# Patient Record
Sex: FEMALE | Race: WHITE
[De-identification: names, ages, dates, MRNs, and addresses within clinical notes are randomized per-mention and may not be internally consistent; named-entity substitution may affect disease eponyms.]

---

## 2017-06-16 ENCOUNTER — HOSPITAL ENCOUNTER (OUTPATIENT)
Dept: HOSPITAL 47 - RADMRIMAIN | Age: 57
Discharge: HOME | End: 2017-06-16
Payer: COMMERCIAL

## 2017-06-16 DIAGNOSIS — Z98.890: ICD-10-CM

## 2017-06-16 DIAGNOSIS — M47.816: Primary | ICD-10-CM

## 2017-06-16 PROCEDURE — 72158 MRI LUMBAR SPINE W/O & W/DYE: CPT

## 2017-06-19 NOTE — MR
EXAMINATION TYPE: MR lumbar spine wo/w con

 

DATE OF EXAM: 6/16/2017

 

COMPARISON: Outside lumbar spine MRI report April 14, 2015

 

HISTORY: LBP, weakness x 1 year, surgery Jan 25, 2015

 

TECHNIQUE: 

Multiplanar, multisequence images of the lumbar spine is performed without and with IV contrast, util
izing 15 mL intravenous MultiHance 

 

FINDINGS: Survey images redemonstrates dextroconvex scoliosis centered in mid lumbar spine. Sagittal 
images of the lumbar spine show vertebral body heights to appear satisfactory. There is subtle grade 
1 anterolisthesis L3 on L4 noted. Postsurgical change L4-L5 level with artifact from posterior fusion
 hardware and disc material is present. Multilevel disc desiccation is seen. No large posterior disc 
herniations are seen on sagittal images. There is persistent mild multilevel disc space narrowing mos
t prominent T11-T12 level similar to prior report. The conus medullaris is normal in position and sig
nal ending at mid L1 level.  The bone marrow signal intensity is within normal limits. No suspicious 
postcontrast enhancement is seen.

 

Axial images show the T12-L1 and L1-L2 levels to appear within normal limits.

 

Axial images at L2-L3 level show mild facet degenerative changes bilaterally, spinal canal is preserv
ed and bilateral neural foramina are patent.

 

Axial images at L3-L4 level show subtle spondylolisthesis. There is artifact from posterior fusion ha
rdware. There is broad-based posterior disc protrusion also seen minimally effacing anterior thecal s
ac with mild left-sided neural foraminal narrowing noted. Right-sided neural foramen is patent.

 

Axial images at L4-L5 level show surgical change from posterior decompression. Spinal canal is preser
kennedy. Artifact from fusion hardware and disc material is present. Bilateral neural foramina are felt p
atent though somewhat obscured by artifact.

 

Axial images at L5-S1 level show mild facet degenerative changes bilaterally. There is right foramina
l disc protrusion causing moderate right-sided neural foraminal narrowing encroaching on the inferior
 margin right L5 nerve seen best on axial image 2 and sagittal image 11. Spinal canal is preserved. L
eft-sided neural foramen is patent.

 

IMPRESSION: Interval surgery L4-L5 level with satisfactory posterior decompression felt present. Ther
e is persistent multilevel degenerative change most prominent in mid to lower lumbar levels. Similar 
to prior report there is eccentric disc herniation L5-S1 level encroaching on right L5 nerve noted. O
ther findings as detailed above.

## 2018-02-10 ENCOUNTER — HOSPITAL ENCOUNTER (EMERGENCY)
Dept: HOSPITAL 47 - EC | Age: 58
Discharge: HOME | End: 2018-02-10
Payer: COMMERCIAL

## 2018-02-10 VITALS — TEMPERATURE: 97.5 F | RESPIRATION RATE: 18 BRPM

## 2018-02-10 VITALS — SYSTOLIC BLOOD PRESSURE: 157 MMHG | HEART RATE: 59 BPM | DIASTOLIC BLOOD PRESSURE: 79 MMHG

## 2018-02-10 DIAGNOSIS — Z87.891: ICD-10-CM

## 2018-02-10 DIAGNOSIS — M17.11: ICD-10-CM

## 2018-02-10 DIAGNOSIS — M25.561: Primary | ICD-10-CM

## 2018-02-10 PROCEDURE — 99284 EMERGENCY DEPT VISIT MOD MDM: CPT

## 2018-02-10 NOTE — XR
EXAMINATION TYPE: XR knee complete RT , 3 VIEWS

 

DATE OF EXAM ORDERED: 2/10/2018

 

HISTORY: Pain.

 

COMPARISON: None.

 

FINDINGS:  No fracture, dislocation or joint effusion is seen. There is mild medial joint space loss.
 There is minimal remodeling changes in the patellofemoral joint. I cannot exclude a tiny right effus
ion.

 

IMPRESSION: 

 

EARLY CHANGES OF OSTEOARTHRITIS.

## 2018-02-10 NOTE — ED
Lower Extremity Injury HPI





- General


Chief Complaint: Extremity Injury, Lower


Stated Complaint: Knee pain


Time Seen by Provider: 02/10/18 11:39


Source: patient, RN notes reviewed


Mode of arrival: ambulatory


Limitations: no limitations





- History of Present Illness


Initial Comments: 





57-year-old female presents emergency Department chief complaint of right knee 

pain.  Patient states that pain started Walter 2 weeks ago and the back of her 

knee.  Patient states pain since progressed and not alleviating.  She states 

she just feels that she cannot get comfortable.  She's had surgery on her left 

inguinal from the right prior.  States that she has not noticed any significant 

swelling denies any redness or rashes.  Patient states pain is worse when she 

presses on certain movements.





- Related Data


 Home Medications











 Medication  Instructions  Recorded  Confirmed


 


No Known Home Medications [No  02/10/18 02/10/18





Known Home Medications]   











 Allergies











Allergy/AdvReac Type Severity Reaction Status Date / Time


 


No Known Allergies Allergy   Verified 02/10/18 12:00














Review of Systems


ROS Statement: 


Those systems with pertinent positive or pertinent negative responses have been 

documented in the HPI.





ROS Other: All systems not noted in ROS Statement are negative.





Past Medical History


Past Medical History: No Reported History


History of Any Multi-Drug Resistant Organisms: None Reported


Past Surgical History: Appendectomy, Back Surgery, Cholecystectomy, Orthopedic 

Surgery


Past Psychological History: No Psychological Hx Reported


Smoking Status: Former smoker


Past Alcohol Use History: None Reported


Past Drug Use History: None Reported





General Exam


Limitations: no limitations


General appearance: alert, in no apparent distress


Head exam: Present: atraumatic, normocephalic, normal inspection


Respiratory exam: Present: normal lung sounds bilaterally.  Absent: respiratory 

distress, wheezes, rales, rhonchi, stridor


Cardiovascular Exam: Present: regular rate, normal rhythm, normal heart sounds.

  Absent: systolic murmur, diastolic murmur, rubs, gallop, clicks


Extremities exam: Present: other (Right knee patient has full range of motion 

though she reports some discomfort fourth knee flexion, there is tenderness in 

the popliteal region pulses.  Are equal bilateral, there is no calf tenderness 

there is no rashes or erythema warmth and right knee.)


Neurological exam: Present: reflexes normal.  Absent: motor sensory deficit





Course


 Vital Signs











  02/10/18





  11:23


 


Temperature 97.5 F L


 


Pulse Rate 60


 


Respiratory 18





Rate 


 


Blood Pressure 190/75


 


O2 Sat by Pulse 100





Oximetry 














Medical Decision Making





- Medical Decision Making





57-year-old female presented from a chief complaint of right knee pain.  

Patient ultrasound, x-ray.  X-ray shows arthritic changes.  Patient is 

recommended follow-up with orthopedics for therapy for possible MRI if symptoms 

persist.  Patient will continue icing, elevating, anti-inflammatories.





Disposition


Clinical Impression: 


 Right knee pain





Disposition: HOME SELF-CARE


Condition: Stable


Instructions:  Knee Pain (ED)


Additional Instructions: 


Please return to the Emergency Department if symptoms worsen or any other 

concerns.


Referrals: 


Juan Carpenter MD [Primary Care Provider] - 1-2 days


Jose Carlos Jauregui MD [STAFF PHYSICIAN] - 1-2 days


Time of Disposition: 13:20

## 2018-02-10 NOTE — US
EXAMINATION TYPE: US venous doppler duplex LE RT

 

DATE OF EXAM: 2/10/2018 12:44 PM

 

COMPARISON: NONE

 

CLINICAL HISTORY: Pain.

 

SIDE PERFORMED: Right  

 

TECHNIQUE:  The lower extremity deep venous system is examined utilizing real time linear array sonog
víctor with graded compression, doppler sonography and color-flow sonography.

 

VESSELS IMAGED:

External Iliac Vein (EIV)

Common Femoral Vein

Deep Femoral Vein

Greater Saphenous Vein *

Femoral Vein

Popliteal Vein

Small Saphenous Vein *

Proximal Calf Veins

(* superficial vessels)

 

Right Leg:  Negative for DVT

 

No popliteal fossa lesion is seen.

 

IMPRESSION:

 

THIS EXAMINATION IS NEGATIVE FOR DVT WITHIN THE RIGHT LEG.